# Patient Record
Sex: FEMALE | Race: BLACK OR AFRICAN AMERICAN | Employment: PART TIME | URBAN - METROPOLITAN AREA
[De-identification: names, ages, dates, MRNs, and addresses within clinical notes are randomized per-mention and may not be internally consistent; named-entity substitution may affect disease eponyms.]

---

## 2021-05-15 NOTE — LETTER
Date & Time: 10/31/2023, 12:06 PM  Patient: Alexandria Durant  Encounter Provider(s):    Triston Johnson MD       To Whom It May Concern:    Alexandria Durant was seen and treated in our department on 10/31/2023. She should not return to work until 11/1/2023 .     If you have any questions or concerns, please do not hesitate to call.        _____________________________  Physician/APC Signature
PRINCIPAL DISCHARGE DIAGNOSIS  Diagnosis: Trochanteric bursitis, right hip  Assessment and Plan of Treatment: You came in with right hip pain. We did imaging including CT and Xray of your ghip that did not show acute fracture or dislocation. Ortho consulted and evaluated you. You were diagnosed with right hip pain secondary to trochanteric bursitis. Hip bursitis is swelling and pain of a bursa in your hip. A bursa is a fluid-filled sac that acts as a cushion or shock absorber between a tendon and a bone. You can take tylenol as needed for pain. Continue prednisone taper which can also help with bursitis. You will be discharged home with home physical therapy. Please followup outpatient with ortho outpatient for further management.  •Avoid repetitive activities that put stress on the hips.  •Get a properly fitting shoe insert for leg length differences.  •Maintain strength and flexibility of the hip muscles.  •Use a walking cane or crutches for a week or more when needed  Most cases of bursitis improve without any treatment over a few weeks. See your healthcare provider if you have any of the following signs or symptoms:  •You experience pain that interferes with your normal day-to-day activities or have soreness that doesn't improve despite self-care measures.  •You have recurrence of bursitis.  •You have a fever or the area affected appears red, swollen or warm.      SECONDARY DISCHARGE DIAGNOSES  Diagnosis: Hypertension  Assessment and Plan of Treatment: Continue atenolol and HCTZ    Diagnosis: GERD (gastroesophageal reflux disease)  Assessment and Plan of Treatment: -continue PPI    Diagnosis: Paroxysmal atrial fibrillation  Assessment and Plan of Treatment: -continue elliquis    Diagnosis: Asthma  Assessment and Plan of Treatment: -continue albuterol  -continue ipatropium  -continue montelukast  -continue flonase  -complete prednisone taper you were prescribed recently for cough

## 2023-08-25 ENCOUNTER — HOSPITAL ENCOUNTER (EMERGENCY)
Facility: HOSPITAL | Age: 43
Discharge: HOME | End: 2023-08-25
Attending: EMERGENCY MEDICINE
Payer: MEDICAID

## 2023-08-25 VITALS
HEIGHT: 61 IN | RESPIRATION RATE: 20 BRPM | HEART RATE: 99 BPM | DIASTOLIC BLOOD PRESSURE: 68 MMHG | BODY MASS INDEX: 30.96 KG/M2 | WEIGHT: 164 LBS | OXYGEN SATURATION: 100 % | SYSTOLIC BLOOD PRESSURE: 108 MMHG | TEMPERATURE: 97 F

## 2023-08-25 DIAGNOSIS — Z76.0 ENCOUNTER FOR MEDICATION REFILL: Primary | ICD-10-CM

## 2023-08-25 DIAGNOSIS — R51.9 ACUTE NONINTRACTABLE HEADACHE, UNSPECIFIED HEADACHE TYPE: ICD-10-CM

## 2023-08-25 LAB
ALBUMIN SERPL-MCNC: 3.4 G/DL (ref 3.4–5)
ALBUMIN/GLOB SERPL: 0.9 {RATIO} (ref 1–2)
ALP LIVER SERPL-CCNC: 92 U/L
ALT SERPL-CCNC: 16 U/L
ANION GAP SERPL CALC-SCNC: 7 MMOL/L (ref 0–18)
AST SERPL-CCNC: 10 U/L (ref 15–37)
B-HCG UR QL: NEGATIVE
BASOPHILS # BLD AUTO: 0.01 X10(3) UL (ref 0–0.2)
BASOPHILS NFR BLD AUTO: 0.1 %
BILIRUB SERPL-MCNC: 0.2 MG/DL (ref 0.1–2)
BILIRUB UR QL: NEGATIVE
BUN BLD-MCNC: 12 MG/DL (ref 7–18)
BUN/CREAT SERPL: 18.5 (ref 10–20)
CALCIUM BLD-MCNC: 8.9 MG/DL (ref 8.5–10.1)
CHLORIDE SERPL-SCNC: 111 MMOL/L (ref 98–112)
CLARITY UR: CLEAR
CO2 SERPL-SCNC: 22 MMOL/L (ref 21–32)
CREAT BLD-MCNC: 0.65 MG/DL
DEPRECATED RDW RBC AUTO: 48.6 FL (ref 35.1–46.3)
EGFRCR SERPLBLD CKD-EPI 2021: 114 ML/MIN/1.73M2 (ref 60–?)
EOSINOPHIL # BLD AUTO: 0.06 X10(3) UL (ref 0–0.7)
EOSINOPHIL NFR BLD AUTO: 0.9 %
ERYTHROCYTE [DISTWIDTH] IN BLOOD BY AUTOMATED COUNT: 14.9 % (ref 11–15)
GLOBULIN PLAS-MCNC: 4 G/DL (ref 2.8–4.4)
GLUCOSE BLD-MCNC: 103 MG/DL (ref 70–99)
GLUCOSE UR-MCNC: NORMAL MG/DL
HCT VFR BLD AUTO: 40.6 %
HGB BLD-MCNC: 13.1 G/DL
IMM GRANULOCYTES # BLD AUTO: 0.02 X10(3) UL (ref 0–1)
IMM GRANULOCYTES NFR BLD: 0.3 %
KETONES UR-MCNC: NEGATIVE MG/DL
LEUKOCYTE ESTERASE UR QL STRIP.AUTO: NEGATIVE
LYMPHOCYTES # BLD AUTO: 2.1 X10(3) UL (ref 1–4)
LYMPHOCYTES NFR BLD AUTO: 31.1 %
MCH RBC QN AUTO: 28.7 PG (ref 26–34)
MCHC RBC AUTO-ENTMCNC: 32.3 G/DL (ref 31–37)
MCV RBC AUTO: 88.8 FL
MONOCYTES # BLD AUTO: 0.42 X10(3) UL (ref 0.1–1)
MONOCYTES NFR BLD AUTO: 6.2 %
NEUTROPHILS # BLD AUTO: 4.15 X10 (3) UL (ref 1.5–7.7)
NEUTROPHILS # BLD AUTO: 4.15 X10(3) UL (ref 1.5–7.7)
NEUTROPHILS NFR BLD AUTO: 61.4 %
NITRITE UR QL STRIP.AUTO: NEGATIVE
OSMOLALITY SERPL CALC.SUM OF ELEC: 290 MOSM/KG (ref 275–295)
PH UR: 6.5 [PH] (ref 5–8)
PLATELET # BLD AUTO: 300 10(3)UL (ref 150–450)
POTASSIUM SERPL-SCNC: 4 MMOL/L (ref 3.5–5.1)
PROT SERPL-MCNC: 7.4 G/DL (ref 6.4–8.2)
PROT UR-MCNC: NEGATIVE MG/DL
RBC # BLD AUTO: 4.57 X10(6)UL
SODIUM SERPL-SCNC: 140 MMOL/L (ref 136–145)
SP GR UR STRIP: 1.02 (ref 1–1.03)
UROBILINOGEN UR STRIP-ACNC: NORMAL
WBC # BLD AUTO: 6.8 X10(3) UL (ref 4–11)

## 2023-08-25 PROCEDURE — 96375 TX/PRO/DX INJ NEW DRUG ADDON: CPT

## 2023-08-25 PROCEDURE — 80053 COMPREHEN METABOLIC PANEL: CPT | Performed by: EMERGENCY MEDICINE

## 2023-08-25 PROCEDURE — 99284 EMERGENCY DEPT VISIT MOD MDM: CPT

## 2023-08-25 PROCEDURE — 85025 COMPLETE CBC W/AUTO DIFF WBC: CPT | Performed by: EMERGENCY MEDICINE

## 2023-08-25 PROCEDURE — 96374 THER/PROPH/DIAG INJ IV PUSH: CPT

## 2023-08-25 PROCEDURE — 81001 URINALYSIS AUTO W/SCOPE: CPT | Performed by: EMERGENCY MEDICINE

## 2023-08-25 PROCEDURE — 96361 HYDRATE IV INFUSION ADD-ON: CPT

## 2023-08-25 PROCEDURE — 81025 URINE PREGNANCY TEST: CPT

## 2023-08-25 RX ORDER — ALBUTEROL SULFATE 90 UG/1
2 AEROSOL, METERED RESPIRATORY (INHALATION) EVERY 6 HOURS PRN
COMMUNITY

## 2023-08-25 RX ORDER — GABAPENTIN 100 MG/1
300 CAPSULE ORAL 3 TIMES DAILY
Qty: 30 CAPSULE | Refills: 0 | Status: SHIPPED | OUTPATIENT
Start: 2023-08-25

## 2023-08-25 RX ORDER — CETIRIZINE HYDROCHLORIDE 10 MG/1
10 TABLET ORAL DAILY
COMMUNITY

## 2023-08-25 RX ORDER — KETOROLAC TROMETHAMINE 15 MG/ML
30 INJECTION, SOLUTION INTRAMUSCULAR; INTRAVENOUS ONCE
Status: COMPLETED | OUTPATIENT
Start: 2023-08-25 | End: 2023-08-25

## 2023-08-25 RX ORDER — LEVETIRACETAM 750 MG/1
750 TABLET, EXTENDED RELEASE ORAL DAILY
Qty: 30 TABLET | Refills: 0 | Status: SHIPPED | OUTPATIENT
Start: 2023-08-25 | End: 2023-09-24

## 2023-08-25 RX ORDER — METHIMAZOLE 5 MG/1
5 TABLET ORAL 3 TIMES DAILY
Qty: 30 TABLET | Refills: 0 | Status: SHIPPED | OUTPATIENT
Start: 2023-08-25

## 2023-08-25 RX ORDER — GABAPENTIN 300 MG/1
300 CAPSULE ORAL 3 TIMES DAILY
COMMUNITY

## 2023-08-25 RX ORDER — BUTALBITAL, ACETAMINOPHEN AND CAFFEINE 50; 325; 40 MG/1; MG/1; MG/1
1 TABLET ORAL EVERY 4 HOURS PRN
COMMUNITY

## 2023-08-25 RX ORDER — METOCLOPRAMIDE HYDROCHLORIDE 5 MG/ML
10 INJECTION INTRAMUSCULAR; INTRAVENOUS ONCE
Status: COMPLETED | OUTPATIENT
Start: 2023-08-25 | End: 2023-08-25

## 2023-08-25 RX ORDER — TOPIRAMATE 50 MG/1
50 TABLET, FILM COATED ORAL 2 TIMES DAILY
COMMUNITY

## 2023-08-25 RX ORDER — METHIMAZOLE 5 MG/1
5 TABLET ORAL 3 TIMES DAILY
COMMUNITY

## 2023-08-25 RX ORDER — BUTALBITAL, ACETAMINOPHEN AND CAFFEINE 50; 325; 40 MG/1; MG/1; MG/1
1 TABLET ORAL EVERY 6 HOURS PRN
Qty: 10 TABLET | Refills: 0 | Status: SHIPPED | OUTPATIENT
Start: 2023-08-25 | End: 2023-09-01

## 2023-08-25 RX ORDER — DIPHENHYDRAMINE HYDROCHLORIDE 50 MG/ML
25 INJECTION INTRAMUSCULAR; INTRAVENOUS ONCE
Status: COMPLETED | OUTPATIENT
Start: 2023-08-25 | End: 2023-08-25

## 2023-08-25 RX ORDER — TOPIRAMATE 100 MG/1
100 TABLET, FILM COATED ORAL 2 TIMES DAILY
Qty: 30 TABLET | Refills: 0 | Status: SHIPPED | OUTPATIENT
Start: 2023-08-25

## 2023-08-25 NOTE — ED INITIAL ASSESSMENT (HPI)
Patient to ED c.o to [facility] with photosensitivity and constant sharp abdominal pain to right side of abd from front to back +nausea no vomiting or diarrhea. Pt takes keppra, gabapentin, topomax and metoprolol and moved from [place] and needs medications refilled, also takes fioricet and is out of all her meds. Hx seizure, migraines, hypothyroidism.     -pt states she also has shingles but its healed already on her left buttock.

## 2023-09-03 ENCOUNTER — HOSPITAL ENCOUNTER (EMERGENCY)
Facility: HOSPITAL | Age: 43
Discharge: HOME | End: 2023-09-03
Attending: EMERGENCY MEDICINE
Payer: MEDICAID

## 2023-09-03 ENCOUNTER — APPOINTMENT (OUTPATIENT)
Dept: CT IMAGING | Facility: HOSPITAL | Age: 43
End: 2023-09-03
Attending: EMERGENCY MEDICINE
Payer: MEDICAID

## 2023-09-03 VITALS
DIASTOLIC BLOOD PRESSURE: 79 MMHG | HEART RATE: 66 BPM | TEMPERATURE: 98 F | SYSTOLIC BLOOD PRESSURE: 108 MMHG | OXYGEN SATURATION: 98 % | RESPIRATION RATE: 18 BRPM

## 2023-09-03 DIAGNOSIS — R22.1 THROAT SWELLING: Primary | ICD-10-CM

## 2023-09-03 PROCEDURE — 99284 EMERGENCY DEPT VISIT MOD MDM: CPT

## 2023-09-03 PROCEDURE — 70490 CT SOFT TISSUE NECK W/O DYE: CPT | Performed by: EMERGENCY MEDICINE

## 2023-09-03 NOTE — ED INITIAL ASSESSMENT (HPI)
Patient was just at work and eating a turkey sandwich. She states she felt something in her throat and was then unable to swallow any water since. She spits it back up. No history of throat obstruction.

## 2023-10-31 ENCOUNTER — HOSPITAL ENCOUNTER (EMERGENCY)
Facility: HOSPITAL | Age: 43
Discharge: HOME | End: 2023-10-31
Attending: EMERGENCY MEDICINE
Payer: MEDICAID

## 2023-10-31 ENCOUNTER — APPOINTMENT (OUTPATIENT)
Dept: CT IMAGING | Facility: HOSPITAL | Age: 43
End: 2023-10-31
Attending: EMERGENCY MEDICINE
Payer: MEDICAID

## 2023-10-31 VITALS
TEMPERATURE: 98 F | HEART RATE: 81 BPM | RESPIRATION RATE: 20 BRPM | OXYGEN SATURATION: 99 % | SYSTOLIC BLOOD PRESSURE: 104 MMHG | WEIGHT: 164 LBS | DIASTOLIC BLOOD PRESSURE: 72 MMHG | BODY MASS INDEX: 31 KG/M2

## 2023-10-31 DIAGNOSIS — R55 SYNCOPE AND COLLAPSE: Primary | ICD-10-CM

## 2023-10-31 DIAGNOSIS — N39.0 URINARY TRACT INFECTION WITHOUT HEMATURIA, SITE UNSPECIFIED: ICD-10-CM

## 2023-10-31 LAB
ALBUMIN SERPL-MCNC: 3.3 G/DL (ref 3.2–4.8)
ALBUMIN/GLOB SERPL: 0.9 {RATIO} (ref 1–2)
ALP LIVER SERPL-CCNC: 96 U/L
ALT SERPL-CCNC: 14 U/L
ANION GAP SERPL CALC-SCNC: 6 MMOL/L (ref 0–18)
AST SERPL-CCNC: 10 U/L (ref ?–34)
ATRIAL RATE: 62 BPM
B-HCG UR QL: NEGATIVE
BASOPHILS # BLD AUTO: 0.01 X10(3) UL (ref 0–0.2)
BASOPHILS NFR BLD AUTO: 0.2 %
BILIRUB SERPL-MCNC: 0.3 MG/DL (ref 0.3–1.2)
BILIRUB UR QL: NEGATIVE
BUN BLD-MCNC: 12 MG/DL (ref 9–23)
BUN/CREAT SERPL: 21.8 (ref 10–20)
CALCIUM BLD-MCNC: 8.7 MG/DL (ref 8.7–10.4)
CHLORIDE SERPL-SCNC: 109 MMOL/L (ref 98–112)
CLARITY UR: CLEAR
CO2 SERPL-SCNC: 26 MMOL/L (ref 21–32)
CREAT BLD-MCNC: 0.55 MG/DL
DEPRECATED RDW RBC AUTO: 44.6 FL (ref 35.1–46.3)
EGFRCR SERPLBLD CKD-EPI 2021: 118 ML/MIN/1.73M2 (ref 60–?)
EOSINOPHIL # BLD AUTO: 0.07 X10(3) UL (ref 0–0.7)
EOSINOPHIL NFR BLD AUTO: 1.2 %
ERYTHROCYTE [DISTWIDTH] IN BLOOD BY AUTOMATED COUNT: 14.3 % (ref 11–15)
GLOBULIN PLAS-MCNC: 3.6 G/DL (ref 2.8–4.4)
GLUCOSE BLD-MCNC: 100 MG/DL (ref 70–99)
GLUCOSE UR-MCNC: NORMAL MG/DL
HCT VFR BLD AUTO: 36.2 %
HGB BLD-MCNC: 11.9 G/DL
HGB UR QL STRIP.AUTO: NEGATIVE
IMM GRANULOCYTES # BLD AUTO: 0.02 X10(3) UL (ref 0–1)
IMM GRANULOCYTES NFR BLD: 0.3 %
KETONES UR-MCNC: NEGATIVE MG/DL
LEUKOCYTE ESTERASE UR QL STRIP.AUTO: NEGATIVE
LYMPHOCYTES # BLD AUTO: 2.05 X10(3) UL (ref 1–4)
LYMPHOCYTES NFR BLD AUTO: 34.1 %
MCH RBC QN AUTO: 28.3 PG (ref 26–34)
MCHC RBC AUTO-ENTMCNC: 32.9 G/DL (ref 31–37)
MCV RBC AUTO: 86.2 FL
MONOCYTES # BLD AUTO: 0.34 X10(3) UL (ref 0.1–1)
MONOCYTES NFR BLD AUTO: 5.6 %
NEUTROPHILS # BLD AUTO: 3.53 X10 (3) UL (ref 1.5–7.7)
NEUTROPHILS # BLD AUTO: 3.53 X10(3) UL (ref 1.5–7.7)
NEUTROPHILS NFR BLD AUTO: 58.6 %
OSMOLALITY SERPL CALC.SUM OF ELEC: 292 MOSM/KG (ref 275–295)
P AXIS: 15 DEGREES
P-R INTERVAL: 136 MS
PH UR: 7.5 [PH] (ref 5–8)
PLATELET # BLD AUTO: 311 10(3)UL (ref 150–450)
POTASSIUM SERPL-SCNC: 3.7 MMOL/L (ref 3.5–5.1)
PROT SERPL-MCNC: 6.9 G/DL (ref 5.7–8.2)
Q-T INTERVAL: 392 MS
QRS DURATION: 76 MS
QTC CALCULATION (BEZET): 397 MS
R AXIS: 84 DEGREES
RBC # BLD AUTO: 4.2 X10(6)UL
SODIUM SERPL-SCNC: 141 MMOL/L (ref 136–145)
SP GR UR STRIP: 1.02 (ref 1–1.03)
T AXIS: 37 DEGREES
TROPONIN I SERPL HS-MCNC: <3 NG/L
UROBILINOGEN UR STRIP-ACNC: NORMAL
VENTRICULAR RATE: 62 BPM
WBC # BLD AUTO: 6 X10(3) UL (ref 4–11)

## 2023-10-31 PROCEDURE — 93010 ELECTROCARDIOGRAM REPORT: CPT

## 2023-10-31 PROCEDURE — 81001 URINALYSIS AUTO W/SCOPE: CPT | Performed by: EMERGENCY MEDICINE

## 2023-10-31 PROCEDURE — 70450 CT HEAD/BRAIN W/O DYE: CPT | Performed by: EMERGENCY MEDICINE

## 2023-10-31 PROCEDURE — 93005 ELECTROCARDIOGRAM TRACING: CPT

## 2023-10-31 PROCEDURE — 36415 COLL VENOUS BLD VENIPUNCTURE: CPT

## 2023-10-31 PROCEDURE — 85025 COMPLETE CBC W/AUTO DIFF WBC: CPT | Performed by: EMERGENCY MEDICINE

## 2023-10-31 PROCEDURE — 99284 EMERGENCY DEPT VISIT MOD MDM: CPT

## 2023-10-31 PROCEDURE — 80053 COMPREHEN METABOLIC PANEL: CPT | Performed by: EMERGENCY MEDICINE

## 2023-10-31 PROCEDURE — 99285 EMERGENCY DEPT VISIT HI MDM: CPT

## 2023-10-31 PROCEDURE — 81025 URINE PREGNANCY TEST: CPT

## 2023-10-31 PROCEDURE — 84484 ASSAY OF TROPONIN QUANT: CPT | Performed by: EMERGENCY MEDICINE

## 2023-10-31 RX ORDER — CIPROFLOXACIN 500 MG/1
500 TABLET, FILM COATED ORAL 2 TIMES DAILY
Qty: 6 TABLET | Refills: 0 | Status: SHIPPED | OUTPATIENT
Start: 2023-10-31 | End: 2023-11-03

## 2023-10-31 NOTE — ED INITIAL ASSESSMENT (HPI)
Patient states she is having seizures, states she has hx of seizures, takes keppra but has not taken it in four weeks for insurance reasons, states she has head pressure and blurry vision

## 2023-10-31 NOTE — DISCHARGE INSTRUCTIONS
Please follow with cardiology as an outpatient. You may benefit from an outpatient Zio patch. Please discuss your chronic syncope with your primary care provider. Return to ER for any worsening symptoms    The Emergency Department is not intended to be a substitute for an effort to provide complete medical care. The imaging, if any, have often been interpreted on a preliminary basis pending final reading by the radiologist. If your blood pressure was greater than 140/90, please have this blood pressure rechecked by your primary care provider wihtin the next few days. You will benefit from a further discussion of lifestyle modifications that include Weight Reduction - Dietary Sodium Restriction - Increased Physical Activity and Moderation in alcohol (ETOH) Consumption. If possible check your pressure at home and keep a blood pressure log to bring to your physician.

## 2024-06-09 PROCEDURE — 99284 EMERGENCY DEPT VISIT MOD MDM: CPT

## 2024-06-09 PROCEDURE — 93010 ELECTROCARDIOGRAM REPORT: CPT

## 2024-06-10 ENCOUNTER — HOSPITAL ENCOUNTER (EMERGENCY)
Facility: HOSPITAL | Age: 44
Discharge: HOME | End: 2024-06-10
Attending: EMERGENCY MEDICINE
Payer: MEDICAID

## 2024-06-10 ENCOUNTER — APPOINTMENT (OUTPATIENT)
Dept: GENERAL RADIOLOGY | Facility: HOSPITAL | Age: 44
End: 2024-06-10
Attending: EMERGENCY MEDICINE
Payer: MEDICAID

## 2024-06-10 VITALS
OXYGEN SATURATION: 98 % | RESPIRATION RATE: 18 BRPM | SYSTOLIC BLOOD PRESSURE: 94 MMHG | HEART RATE: 79 BPM | TEMPERATURE: 98 F | DIASTOLIC BLOOD PRESSURE: 56 MMHG

## 2024-06-10 DIAGNOSIS — R00.2 PALPITATIONS: Primary | ICD-10-CM

## 2024-06-10 LAB
ALBUMIN SERPL-MCNC: 4.4 G/DL (ref 3.2–4.8)
ALBUMIN/GLOB SERPL: 1.5 {RATIO} (ref 1–2)
ALP LIVER SERPL-CCNC: 76 U/L
ALT SERPL-CCNC: 12 U/L
ANION GAP SERPL CALC-SCNC: 9 MMOL/L (ref 0–18)
AST SERPL-CCNC: 36 U/L (ref ?–34)
ATRIAL RATE: 80 BPM
BASOPHILS # BLD AUTO: 0.03 X10(3) UL (ref 0–0.2)
BASOPHILS NFR BLD AUTO: 0.3 %
BILIRUB SERPL-MCNC: 0.5 MG/DL (ref 0.3–1.2)
BUN BLD-MCNC: 12 MG/DL (ref 9–23)
BUN/CREAT SERPL: 16.4 (ref 10–20)
CALCIUM BLD-MCNC: 9.5 MG/DL (ref 8.7–10.4)
CHLORIDE SERPL-SCNC: 107 MMOL/L (ref 98–112)
CO2 SERPL-SCNC: 22 MMOL/L (ref 21–32)
CREAT BLD-MCNC: 0.73 MG/DL
D DIMER PPP FEU-MCNC: 0.37 UG/ML FEU (ref ?–0.5)
DEPRECATED RDW RBC AUTO: 45.5 FL (ref 35.1–46.3)
EGFRCR SERPLBLD CKD-EPI 2021: 106 ML/MIN/1.73M2 (ref 60–?)
EOSINOPHIL # BLD AUTO: 0.13 X10(3) UL (ref 0–0.7)
EOSINOPHIL NFR BLD AUTO: 1.4 %
ERYTHROCYTE [DISTWIDTH] IN BLOOD BY AUTOMATED COUNT: 14.6 % (ref 11–15)
GLOBULIN PLAS-MCNC: 3 G/DL (ref 2–3.5)
GLUCOSE BLD-MCNC: 118 MG/DL (ref 70–99)
HCT VFR BLD AUTO: 36.4 %
HGB BLD-MCNC: 12.5 G/DL
IMM GRANULOCYTES # BLD AUTO: 0.02 X10(3) UL (ref 0–1)
IMM GRANULOCYTES NFR BLD: 0.2 %
LYMPHOCYTES # BLD AUTO: 3.1 X10(3) UL (ref 1–4)
LYMPHOCYTES NFR BLD AUTO: 33.4 %
MAGNESIUM SERPL-MCNC: 1.8 MG/DL (ref 1.6–2.6)
MCH RBC QN AUTO: 29.5 PG (ref 26–34)
MCHC RBC AUTO-ENTMCNC: 34.3 G/DL (ref 31–37)
MCV RBC AUTO: 85.8 FL
MONOCYTES # BLD AUTO: 0.43 X10(3) UL (ref 0.1–1)
MONOCYTES NFR BLD AUTO: 4.6 %
NEUTROPHILS # BLD AUTO: 5.57 X10 (3) UL (ref 1.5–7.7)
NEUTROPHILS # BLD AUTO: 5.57 X10(3) UL (ref 1.5–7.7)
NEUTROPHILS NFR BLD AUTO: 60.1 %
OSMOLALITY SERPL CALC.SUM OF ELEC: 287 MOSM/KG (ref 275–295)
P AXIS: 22 DEGREES
P-R INTERVAL: 146 MS
PLATELET # BLD AUTO: 355 10(3)UL (ref 150–450)
POTASSIUM SERPL-SCNC: 5.6 MMOL/L (ref 3.5–5.1)
PROT SERPL-MCNC: 7.4 G/DL (ref 5.7–8.2)
Q-T INTERVAL: 368 MS
QRS DURATION: 72 MS
QTC CALCULATION (BEZET): 424 MS
R AXIS: 83 DEGREES
RBC # BLD AUTO: 4.24 X10(6)UL
SODIUM SERPL-SCNC: 138 MMOL/L (ref 136–145)
T AXIS: 29 DEGREES
TROPONIN I SERPL HS-MCNC: <3 NG/L
TSI SER-ACNC: 2.11 MIU/ML (ref 0.55–4.78)
VENTRICULAR RATE: 80 BPM
WBC # BLD AUTO: 9.3 X10(3) UL (ref 4–11)

## 2024-06-10 PROCEDURE — 71045 X-RAY EXAM CHEST 1 VIEW: CPT | Performed by: EMERGENCY MEDICINE

## 2024-06-10 PROCEDURE — 85379 FIBRIN DEGRADATION QUANT: CPT | Performed by: EMERGENCY MEDICINE

## 2024-06-10 PROCEDURE — 85025 COMPLETE CBC W/AUTO DIFF WBC: CPT | Performed by: EMERGENCY MEDICINE

## 2024-06-10 PROCEDURE — 84484 ASSAY OF TROPONIN QUANT: CPT | Performed by: EMERGENCY MEDICINE

## 2024-06-10 PROCEDURE — 84484 ASSAY OF TROPONIN QUANT: CPT

## 2024-06-10 PROCEDURE — 93005 ELECTROCARDIOGRAM TRACING: CPT

## 2024-06-10 PROCEDURE — 84443 ASSAY THYROID STIM HORMONE: CPT | Performed by: EMERGENCY MEDICINE

## 2024-06-10 PROCEDURE — 96360 HYDRATION IV INFUSION INIT: CPT

## 2024-06-10 PROCEDURE — 85025 COMPLETE CBC W/AUTO DIFF WBC: CPT

## 2024-06-10 PROCEDURE — 80053 COMPREHEN METABOLIC PANEL: CPT | Performed by: EMERGENCY MEDICINE

## 2024-06-10 PROCEDURE — 83735 ASSAY OF MAGNESIUM: CPT | Performed by: EMERGENCY MEDICINE

## 2024-06-10 PROCEDURE — 96361 HYDRATE IV INFUSION ADD-ON: CPT

## 2024-06-10 PROCEDURE — 80053 COMPREHEN METABOLIC PANEL: CPT

## 2024-06-10 NOTE — ED QUICK NOTES
IV fluids were not infusing well.  IV flushed and fluids now infusing. Pt notified of plan of care to discharge once fluids completed.

## 2024-06-10 NOTE — ED INITIAL ASSESSMENT (HPI)
S: intermittent palpitations all day. Pt states when she had this before her thyroid levels were off, but when she had them rechecked they were normal and she was taken off thyroid meds.

## 2024-06-13 NOTE — ED PROVIDER NOTES
Patient Seen in: [facility] Emergency Department      History     Chief Complaint   Patient presents with    Arrythmia/Palpitations     Stated Complaint: chest pain    Subjective:   HPI    43 year old female with h/o seizure d/o who presents with CP and palpitations. Pt states she stopped her thyroid medication when levels were normal after not taking medication for awhile. Pt has young children, states could also be due to stress, dehydration. She otherwise denies: fever/chills, sob, pain on deep breathing, ocp use, h/o blood clot or clotting d/o, recent surgery or recent sedentary period.    Objective:   Past Medical History:    Seizure disorder (HCC)              History reviewed. No pertinent surgical history.             Social History     Socioeconomic History    Marital status:    Tobacco Use    Smoking status: Some Days     Types: Cigarettes    Smokeless tobacco: Never   Vaping Use    Vaping status: Some Days   Substance and Sexual Activity    Alcohol use: Yes     Comment: social    Drug use: Yes     Types: Cannabis     Social Determinants of Health     Housing Stability: Low Risk  (10/11/2023)    [facility]    Housing Stability   Recent Concern: Housing Stability - At Risk (8/16/2023)    Received from [facility]              Review of Systems    Positive for stated complaint: chest pain  Other systems are as noted in HPI.  Constitutional and vital signs reviewed.      All other systems reviewed and negative except as noted above.    Physical Exam     ED Triage Vitals [06/10/24 0010]   /76   Pulse 72   Resp 20   Temp 98.4 °F (36.9 °C)   Temp src Oral   SpO2 100 %   O2 Device None (Room air)       Current Vitals:   No data recorded        Physical Exam  Vitals and nursing note reviewed.   Constitutional:       General: She is not in acute distress.     Appearance: Normal appearance. She is well-developed.  She is not ill-appearing, toxic-appearing or diaphoretic.   HENT:      Head: Normocephalic and atraumatic.   Eyes:      Conjunctiva/sclera: Conjunctivae normal.      Pupils: Pupils are equal, round, and reactive to light.   Cardiovascular:      Rate and Rhythm: Normal rate and regular rhythm.      Pulses: Normal pulses.      Heart sounds: Normal heart sounds. No murmur heard.  Pulmonary:      Effort: Pulmonary effort is normal. No respiratory distress.      Breath sounds: Normal breath sounds. No wheezing.   Abdominal:      General: There is no distension.      Palpations: Abdomen is soft.      Tenderness: There is no abdominal tenderness. There is no guarding.   Musculoskeletal:         General: No tenderness. Normal range of motion.      Cervical back: Full passive range of motion without pain, normal range of motion and neck supple. No rigidity. Normal range of motion.      Right lower leg: No edema.      Left lower leg: No edema.   Skin:     General: Skin is warm and dry.      Findings: No rash.   Neurological:      Mental Status: She is alert and oriented to person, place, and time.      GCS: GCS eye subscore is 4. GCS verbal subscore is 5. GCS motor subscore is 6.      Sensory: Sensation is intact. No sensory deficit.      Motor: Motor function is intact. No weakness.   Psychiatric:         Attention and Perception: Attention normal.         Mood and Affect: Mood normal.         Behavior: Behavior normal. Behavior is cooperative.           ED Course     Labs Reviewed   COMP METABOLIC PANEL (14) - Abnormal; Notable for the following components:       Result Value    Glucose 118 (*)     Potassium 5.6 (*)     AST 36 (*)     All other components within normal limits   TROPONIN I HIGH SENSITIVITY - Normal   TSH W REFLEX TO FREE T4 - Normal   MAGNESIUM - Normal   D-DIMER - Normal   CBC WITH DIFFERENTIAL WITH PLATELET    Narrative:     The following orders were created for panel order CBC With Differential With  Platelet.  Procedure                               Abnormality         Status                     ---------                               -----------         ------                     CBC W/ DIFFERENTIAL[[id number]]                              Final result                 Please view results for these tests on the individual orders.   RAINBOW DRAW BLUE   RAINBOW DRAW GOLD   CBC W/ DIFFERENTIAL     EKG    Rate, intervals and axes as noted on EKG Report.  Rate: 80  Rhythm: Sinus Rhythm  Reading: sinus rhythm with PVC              ED Course as of 06/13/24 1424  ------------------------------------------------------------  Time: 06/10 0143  Comment: AP portable upright chest x-ray obtained on 6/10/24 at 0041 hours    IMPRESSION:    No acute intrathoracic process.                MDM      Pulse Ox: 98%, Normal, RA    Medications   sodium chloride 0.9 % IV bolus 1,000 mL (0 mL Intravenous Stopped 6/10/24 0312)     Patient is well-appearing, feeling better after the fluid bolus.  Her EKG, D-dimer, thyroid, magnesium, troponin and other labs are reassuring.  I suspect that her potassium is not accurate given that her creatinine is normal, she also had fluid bolus after this and therefore will be Lasix regardless.  Patient was advised to follow-up closely with her primary care physician may need outpatient monitoring.  Return precautions also discussed.      Disposition and Plan     Clinical Impression:  1. Palpitations         Disposition:  Discharge  6/10/2024  3:00 am    Follow-up:  Shannan Fang MD  [address] [place] [id number]  [phone]    Schedule an appointment as soon as possible for a visit  Call for next available appointment, If you need a new primary care provider          Medications Prescribed:  Discharge Medication List as of 6/10/2024  3:13 AM

## 2024-08-06 ENCOUNTER — HOSPITAL ENCOUNTER (EMERGENCY)
Facility: HOSPITAL | Age: 44
Discharge: HOME | End: 2024-08-06
Attending: STUDENT IN AN ORGANIZED HEALTH CARE EDUCATION/TRAINING PROGRAM
Payer: MEDICAID

## 2024-08-06 VITALS
TEMPERATURE: 99 F | HEART RATE: 77 BPM | HEIGHT: 61 IN | BODY MASS INDEX: 30.96 KG/M2 | RESPIRATION RATE: 18 BRPM | OXYGEN SATURATION: 99 % | WEIGHT: 164 LBS | SYSTOLIC BLOOD PRESSURE: 112 MMHG | DIASTOLIC BLOOD PRESSURE: 69 MMHG

## 2024-08-06 DIAGNOSIS — R11.2 NAUSEA VOMITING AND DIARRHEA: Primary | ICD-10-CM

## 2024-08-06 DIAGNOSIS — R19.7 NAUSEA VOMITING AND DIARRHEA: Primary | ICD-10-CM

## 2024-08-06 LAB
ALBUMIN SERPL-MCNC: 4.2 G/DL (ref 3.2–4.8)
ALBUMIN/GLOB SERPL: 1.4 {RATIO} (ref 1–2)
ALP LIVER SERPL-CCNC: 83 U/L
ALT SERPL-CCNC: 10 U/L
ANION GAP SERPL CALC-SCNC: 5 MMOL/L (ref 0–18)
AST SERPL-CCNC: 15 U/L (ref ?–34)
BASOPHILS # BLD AUTO: 0.03 X10(3) UL (ref 0–0.2)
BASOPHILS NFR BLD AUTO: 0.5 %
BILIRUB SERPL-MCNC: 0.6 MG/DL (ref 0.3–1.2)
BUN BLD-MCNC: 9 MG/DL (ref 9–23)
BUN/CREAT SERPL: 13.8 (ref 10–20)
CALCIUM BLD-MCNC: 9.1 MG/DL (ref 8.7–10.4)
CHLORIDE SERPL-SCNC: 108 MMOL/L (ref 98–112)
CO2 SERPL-SCNC: 26 MMOL/L (ref 21–32)
CREAT BLD-MCNC: 0.65 MG/DL
DEPRECATED RDW RBC AUTO: 47.8 FL (ref 35.1–46.3)
EGFRCR SERPLBLD CKD-EPI 2021: 113 ML/MIN/1.73M2 (ref 60–?)
EOSINOPHIL # BLD AUTO: 0.07 X10(3) UL (ref 0–0.7)
EOSINOPHIL NFR BLD AUTO: 1.1 %
ERYTHROCYTE [DISTWIDTH] IN BLOOD BY AUTOMATED COUNT: 14.8 % (ref 11–15)
GLOBULIN PLAS-MCNC: 3 G/DL (ref 2–3.5)
GLUCOSE BLD-MCNC: 88 MG/DL (ref 70–99)
HCG SERPL QL: NEGATIVE
HCT VFR BLD AUTO: 35.8 %
HGB BLD-MCNC: 11.8 G/DL
IMM GRANULOCYTES # BLD AUTO: 0.02 X10(3) UL (ref 0–1)
IMM GRANULOCYTES NFR BLD: 0.3 %
LIPASE SERPL-CCNC: 27 U/L (ref 12–53)
LYMPHOCYTES # BLD AUTO: 2.21 X10(3) UL (ref 1–4)
LYMPHOCYTES NFR BLD AUTO: 35 %
MCH RBC QN AUTO: 28.9 PG (ref 26–34)
MCHC RBC AUTO-ENTMCNC: 33 G/DL (ref 31–37)
MCV RBC AUTO: 87.5 FL
MONOCYTES # BLD AUTO: 0.54 X10(3) UL (ref 0.1–1)
MONOCYTES NFR BLD AUTO: 8.6 %
NEUTROPHILS # BLD AUTO: 3.44 X10 (3) UL (ref 1.5–7.7)
NEUTROPHILS # BLD AUTO: 3.44 X10(3) UL (ref 1.5–7.7)
NEUTROPHILS NFR BLD AUTO: 54.5 %
OSMOLALITY SERPL CALC.SUM OF ELEC: 286 MOSM/KG (ref 275–295)
PLATELET # BLD AUTO: 341 10(3)UL (ref 150–450)
POTASSIUM SERPL-SCNC: 4.2 MMOL/L (ref 3.5–5.1)
PROT SERPL-MCNC: 7.2 G/DL (ref 5.7–8.2)
RBC # BLD AUTO: 4.09 X10(6)UL
SODIUM SERPL-SCNC: 139 MMOL/L (ref 136–145)
WBC # BLD AUTO: 6.3 X10(3) UL (ref 4–11)

## 2024-08-06 PROCEDURE — 96374 THER/PROPH/DIAG INJ IV PUSH: CPT

## 2024-08-06 PROCEDURE — 99284 EMERGENCY DEPT VISIT MOD MDM: CPT

## 2024-08-06 PROCEDURE — 80053 COMPREHEN METABOLIC PANEL: CPT | Performed by: STUDENT IN AN ORGANIZED HEALTH CARE EDUCATION/TRAINING PROGRAM

## 2024-08-06 PROCEDURE — 85025 COMPLETE CBC W/AUTO DIFF WBC: CPT | Performed by: STUDENT IN AN ORGANIZED HEALTH CARE EDUCATION/TRAINING PROGRAM

## 2024-08-06 PROCEDURE — 96361 HYDRATE IV INFUSION ADD-ON: CPT

## 2024-08-06 PROCEDURE — 83690 ASSAY OF LIPASE: CPT | Performed by: STUDENT IN AN ORGANIZED HEALTH CARE EDUCATION/TRAINING PROGRAM

## 2024-08-06 PROCEDURE — 84703 CHORIONIC GONADOTROPIN ASSAY: CPT | Performed by: STUDENT IN AN ORGANIZED HEALTH CARE EDUCATION/TRAINING PROGRAM

## 2024-08-06 RX ORDER — ONDANSETRON 4 MG/1
4 TABLET, ORALLY DISINTEGRATING ORAL EVERY 4 HOURS PRN
Qty: 10 TABLET | Refills: 0 | Status: SHIPPED | OUTPATIENT
Start: 2024-08-06 | End: 2024-08-13

## 2024-08-06 RX ORDER — DICYCLOMINE HCL 20 MG
20 TABLET ORAL 4 TIMES DAILY PRN
Qty: 30 TABLET | Refills: 0 | Status: SHIPPED | OUTPATIENT
Start: 2024-08-06 | End: 2024-09-05

## 2024-08-06 RX ORDER — ONDANSETRON 2 MG/ML
4 INJECTION INTRAMUSCULAR; INTRAVENOUS ONCE
Status: COMPLETED | OUTPATIENT
Start: 2024-08-06 | End: 2024-08-06

## 2024-08-06 NOTE — ED PROVIDER NOTES
[facility] Emergency Department Note  Patient: Krystal Morales Age: 43 year old Sex: female      MRN: [id number]  : 10/17/1980    Patient Seen in: [facility] Emergency Department    History     Chief Complaint   Patient presents with    Abdominal Pain     Stated Complaint: Abd Pain; Vomiting    History obtained from: Patient    43-year-old female with past medical history of seizure disorder presenting today for evaluation for nausea, vomiting, diarrhea over the past 3 days.  States that symptoms started after eating out at a restaurant.  No fevers.  Endorses pain in the epigastrium.  Does not radiate.  Worse after eating.  No sore throat.  No cough or nasal congestion.    Review of Systems:  Review of Systems  Positive for stated complaint: Abd Pain; Vomiting. Constitutional and vital signs reviewed. All other systems reviewed and negative except as noted above.    Patient History:  Past Medical History:    Seizure disorder (HCC)       History reviewed. No pertinent surgical history.     History reviewed. No pertinent family history.    Specific Social Determinants of Health:   Social History     Socioeconomic History    Marital status:    Tobacco Use    Smoking status: Some Days     Types: Cigarettes    Smokeless tobacco: Never   Vaping Use    Vaping status: Some Days   Substance and Sexual Activity    Alcohol use: Yes     Comment: social    Drug use: Yes     Types: Cannabis     Social Determinants of Health     Housing Stability: Low Risk  (10/11/2023)    [facility], [facility]    Housing Stability   Recent Concern: Housing Stability - At Risk (2023)    [facility]           PSFH elements reviewed from today and agreed except as otherwise stated in HPI.    Physical Exam     ED Triage Vitals [24 1216]   /70   Pulse 92   Resp 18   Temp 98.7 °F (37.1 °C)   Temp src Oral   SpO2 98 %   O2 Device None  (Room air)       Current:/69   Pulse 77   Temp 98.7 °F (37.1 °C) (Oral)   Resp 18   Ht 154.9 cm (5' 1\")   Wt 74.4 kg   LMP 04/18/2024 (Approximate)   SpO2 99%   BMI 30.99 kg/m²         Physical Exam  Constitutional:       General: She is not in acute distress.  HENT:      Head: Normocephalic and atraumatic.      Mouth/Throat:      Mouth: Mucous membranes are moist.      Pharynx: No pharyngeal swelling or oropharyngeal exudate.   Eyes:      Extraocular Movements: Extraocular movements intact.   Cardiovascular:      Rate and Rhythm: Normal rate and regular rhythm.      Heart sounds: Normal heart sounds.   Pulmonary:      Effort: Pulmonary effort is normal. No respiratory distress.      Breath sounds: Normal breath sounds.   Abdominal:      Palpations: Abdomen is soft.      Tenderness: There is abdominal tenderness in the epigastric area.   Skin:     General: Skin is warm and dry.      Capillary Refill: Capillary refill takes less than 2 seconds.      Findings: No rash.   Neurological:      General: No focal deficit present.      Mental Status: She is alert and oriented to person, place, and time.   Psychiatric:         Mood and Affect: Mood normal.         Behavior: Behavior normal.         ED Course   Labs:   Labs Reviewed   CBC WITH DIFFERENTIAL WITH PLATELET - Abnormal; Notable for the following components:       Result Value    HGB 11.8 (*)     RDW-SD 47.8 (*)     All other components within normal limits   COMP METABOLIC PANEL (14) - Normal   HCG, BETA SUBUNIT, QUAL - Normal   LIPASE - Normal   RAINBOW DRAW LAVENDER   RAINBOW DRAW LIGHT GREEN     Radiology findings:  I personally reviewed the images.   No results found.      Cardiac Monitor: Interpreted by me.   Pulse Readings from Last 1 Encounters:   08/06/24 77   , sinus,     External non-ED records reviewed independently by me: Labs from 6/10/2024 with baseline creatinine of 0.73.    MDM   43-year-old female with a past medical history of  seizure disorder presenting for evaluation with nausea, vomiting, diarrhea over the past 3 days after eating at a restaurant.  On exam, she has reproducible tenderness in the epigastrium with no rebound or guarding.  Overall well-appearing.  Appears well-hydrated and well-perfused.    Differential diagnoses considered includes, but is not limited to: Viral gastroenteritis, foodborne illness, gastritis, pancreatitis, biliary obstructive process, hepatitis    Will obtain the following tests: CBC, CMP, lipase, beta hCG qualitative  Please see ED course for my independent review of these tests/imaging results.    Initial Medications/Therapeutics administered: Zofran, IV fluid bolus    Chronic conditions affecting care: Seizure disorder    Workup and medications considered but not ordered: Consider abdominal imaging however exam is fairly benign with no laboratory abnormalities to suggest biliary obstructive process or pancreatitis.    Social Determinants of Health that impacted care: none     ED course: Labs reassuring.  No evidence of biliary obstructive process.  Lipase not elevated.  Upon reevaluation, noted to have resolution of her nausea and vomiting and abdominal pain.  Suspect symptoms secondary to viral gastroenteritis versus foodborne illness.  Discussed symptomatic care with Zofran and Bentyl as needed.  Return precautions provided and all questions answered.  Patient expressed understanding and agreement with plan        Disposition and Plan     Clinical Impression:  1. Nausea vomiting and diarrhea        Disposition:  Discharge    Follow-up:  Desmond Cote MD  [address] [place] [id number] [phone]    Schedule an appointment as soon as possible for a visit in 2 day(s)  As needed, If symptoms worsen      Medications Prescribed:  Discharge Medication List as of 8/6/2024  2:00 PM        START taking these medications    Details   ondansetron 4 MG Oral Tablet Dispersible Take 1 tablet (4 mg  total) by mouth every 4 (four) hours as needed for Nausea., Normal, Disp-10 tablet, R-0      dicyclomine 20 MG Oral Tab Take 1 tablet (20 mg total) by mouth 4 (four) times daily as needed., Normal, Disp-30 tablet, R-0               This note may have been created using voice dictation technology and may include inadvertent errors.      Barbara Ludwig MD  Emergency Medicine

## 2024-08-06 NOTE — DISCHARGE INSTRUCTIONS
Thank you for seeking care at [facility] Emergency Department.  You have been seen and evaluated for vomiting.    We discussed the results of your workup   Please read the instructions provided   If given prescriptions, take as instructed    Please drink plenty of fluids at home. Progressively advance your diet as tolerated. Drink clear, sugar-free, non-citrus liquids frequently in small amounts. Advance your diet from bland foods (crackers, plain boiled rice, potato, noodles, pasta, oatmeal, boiled egg, boiled vegetables, broths, applesauce, bananas, non-citrus fruits) as tolerated. Avoid greasy, fatty, or fried foods, fast food, junk food or snack food.    Remember, your care process does not end after your visit today. Please follow-up with your doctor within 1-2 days for a follow-up check to ensure you are  improving, to see if you need any further evaluation/testing, or to evaluate for any alternate diagnoses.     Please return to the emergency department if you develop high fevers, have persistent severe abdominal pain, or have bloody stools or vomit, if you are unable to keep down liquids due to frequent vomiting, or if you develop any other new or concerning symptoms as these could be signs of more serious medical illness.    We hope you feel better.

## 2024-08-06 NOTE — ED INITIAL ASSESSMENT (HPI)
Pt came in for vomiting, diarrhea,generalized abdominal pain started Saturday after eating out.  Per pt she can't keep anything down.  Pt is A/Ox  4, breathing unlabored.

## 2024-12-07 ENCOUNTER — APPOINTMENT (OUTPATIENT)
Dept: GENERAL RADIOLOGY | Facility: HOSPITAL | Age: 44
End: 2024-12-07
Attending: EMERGENCY MEDICINE
Payer: MEDICAID

## 2024-12-07 ENCOUNTER — HOSPITAL ENCOUNTER (EMERGENCY)
Facility: HOSPITAL | Age: 44
Discharge: HOME | End: 2024-12-07
Attending: EMERGENCY MEDICINE
Payer: MEDICAID

## 2024-12-07 VITALS
SYSTOLIC BLOOD PRESSURE: 118 MMHG | RESPIRATION RATE: 18 BRPM | HEART RATE: 68 BPM | TEMPERATURE: 98 F | DIASTOLIC BLOOD PRESSURE: 73 MMHG | OXYGEN SATURATION: 100 %

## 2024-12-07 DIAGNOSIS — R07.89 CHEST PAIN, ATYPICAL: Primary | ICD-10-CM

## 2024-12-07 LAB
ALBUMIN SERPL-MCNC: 4.4 G/DL (ref 3.2–4.8)
ALP LIVER SERPL-CCNC: 87 U/L
ALT SERPL-CCNC: 8 U/L
ANION GAP SERPL CALC-SCNC: 6 MMOL/L (ref 0–18)
AST SERPL-CCNC: 13 U/L (ref ?–34)
BASOPHILS # BLD AUTO: 0.02 X10(3) UL (ref 0–0.2)
BASOPHILS NFR BLD AUTO: 0.3 %
BILIRUB DIRECT SERPL-MCNC: <0.1 MG/DL (ref ?–0.3)
BILIRUB SERPL-MCNC: 0.4 MG/DL (ref 0.3–1.2)
BUN BLD-MCNC: 13 MG/DL (ref 9–23)
BUN/CREAT SERPL: 19.7 (ref 10–20)
CALCIUM BLD-MCNC: 9.6 MG/DL (ref 8.7–10.4)
CHLORIDE SERPL-SCNC: 107 MMOL/L (ref 98–112)
CO2 SERPL-SCNC: 25 MMOL/L (ref 21–32)
CREAT BLD-MCNC: 0.66 MG/DL
D DIMER PPP FEU-MCNC: 0.29 UG/ML FEU (ref ?–0.5)
DEPRECATED RDW RBC AUTO: 46.6 FL (ref 35.1–46.3)
EGFRCR SERPLBLD CKD-EPI 2021: 112 ML/MIN/1.73M2 (ref 60–?)
EOSINOPHIL # BLD AUTO: 0.05 X10(3) UL (ref 0–0.7)
EOSINOPHIL NFR BLD AUTO: 0.7 %
ERYTHROCYTE [DISTWIDTH] IN BLOOD BY AUTOMATED COUNT: 14.6 % (ref 11–15)
GLUCOSE BLD-MCNC: 96 MG/DL (ref 70–99)
HCT VFR BLD AUTO: 35.7 %
HGB BLD-MCNC: 12 G/DL
IMM GRANULOCYTES # BLD AUTO: 0.02 X10(3) UL (ref 0–1)
IMM GRANULOCYTES NFR BLD: 0.3 %
LIPASE SERPL-CCNC: 24 U/L (ref 12–53)
LYMPHOCYTES # BLD AUTO: 2.61 X10(3) UL (ref 1–4)
LYMPHOCYTES NFR BLD AUTO: 36.7 %
MCH RBC QN AUTO: 29.4 PG (ref 26–34)
MCHC RBC AUTO-ENTMCNC: 33.6 G/DL (ref 31–37)
MCV RBC AUTO: 87.5 FL
MONOCYTES # BLD AUTO: 0.42 X10(3) UL (ref 0.1–1)
MONOCYTES NFR BLD AUTO: 5.9 %
NEUTROPHILS # BLD AUTO: 3.99 X10 (3) UL (ref 1.5–7.7)
NEUTROPHILS # BLD AUTO: 3.99 X10(3) UL (ref 1.5–7.7)
NEUTROPHILS NFR BLD AUTO: 56.1 %
OSMOLALITY SERPL CALC.SUM OF ELEC: 286 MOSM/KG (ref 275–295)
PLATELET # BLD AUTO: 318 10(3)UL (ref 150–450)
POTASSIUM SERPL-SCNC: 4.3 MMOL/L (ref 3.5–5.1)
PROT SERPL-MCNC: 7 G/DL (ref 5.7–8.2)
RBC # BLD AUTO: 4.08 X10(6)UL
SODIUM SERPL-SCNC: 138 MMOL/L (ref 136–145)
TROPONIN I SERPL HS-MCNC: <3 NG/L
WBC # BLD AUTO: 7.1 X10(3) UL (ref 4–11)

## 2024-12-07 PROCEDURE — 80048 BASIC METABOLIC PNL TOTAL CA: CPT | Performed by: EMERGENCY MEDICINE

## 2024-12-07 PROCEDURE — 80076 HEPATIC FUNCTION PANEL: CPT | Performed by: EMERGENCY MEDICINE

## 2024-12-07 PROCEDURE — 84484 ASSAY OF TROPONIN QUANT: CPT | Performed by: EMERGENCY MEDICINE

## 2024-12-07 PROCEDURE — 99284 EMERGENCY DEPT VISIT MOD MDM: CPT

## 2024-12-07 PROCEDURE — 93010 ELECTROCARDIOGRAM REPORT: CPT

## 2024-12-07 PROCEDURE — 36415 COLL VENOUS BLD VENIPUNCTURE: CPT

## 2024-12-07 PROCEDURE — 85379 FIBRIN DEGRADATION QUANT: CPT | Performed by: EMERGENCY MEDICINE

## 2024-12-07 PROCEDURE — 93005 ELECTROCARDIOGRAM TRACING: CPT

## 2024-12-07 PROCEDURE — 99285 EMERGENCY DEPT VISIT HI MDM: CPT

## 2024-12-07 PROCEDURE — 71045 X-RAY EXAM CHEST 1 VIEW: CPT | Performed by: EMERGENCY MEDICINE

## 2024-12-07 PROCEDURE — 83690 ASSAY OF LIPASE: CPT | Performed by: EMERGENCY MEDICINE

## 2024-12-07 PROCEDURE — 85025 COMPLETE CBC W/AUTO DIFF WBC: CPT | Performed by: EMERGENCY MEDICINE

## 2024-12-07 RX ORDER — IBUPROFEN 600 MG/1
600 TABLET, FILM COATED ORAL ONCE
Status: COMPLETED | OUTPATIENT
Start: 2024-12-07 | End: 2024-12-07

## 2024-12-07 NOTE — ED INITIAL ASSESSMENT (HPI)
Central chest pain radiating to back. x2 weeks. Patient states pain increasing, unable to sleep on back. Denies SOB. Denies blood thinners.

## 2024-12-07 NOTE — ED PROVIDER NOTES
Patient Seen in: [facility] Emergency Department    History     Chief Complaint   Patient presents with    Chest Pain Angina     Stated Complaint: chest/back pain    HPI    44 year old female presenting with chest pain. Pain began about 2 weeks ago . The patient describes the pain as sharp, located at the xiphoid process, with radiation to the mid back.   Associated symptoms are none.  Aggravating factors are sleeping on back.  Alleviating factors are:  none.   Patient's cardiac risk factors are BMI and smoking.    Patient's risk factors for DVT/PE: none.      Past Medical History:    Seizure disorder (HCC)    Shingles    Thyroid disease       History reviewed. No pertinent surgical history.         No family history on file.    Social History     Socioeconomic History    Marital status:    Tobacco Use    Smoking status: Some Days     Types: Cigarettes    Smokeless tobacco: Never   Vaping Use    Vaping status: Some Days   Substance and Sexual Activity    Alcohol use: Yes     Comment: social    Drug use: Yes     Types: Cannabis     Social Drivers of Health     Housing Stability: Low Risk  (10/11/2023)    [facility]    Housing Stability   Recent Concern: Housing Stability - At Risk (8/16/2023)    [facility]       Review of Systems    Positive for stated complaint: chest/back pain  Other systems are as noted in HPI.  Constitutional and vital signs reviewed.      All other systems reviewed and negative except as noted above.    PSFH elements reviewed from today and agreed except as otherwise stated in HPI.    Physical Exam     ED Triage Vitals [12/07/24 0617]   /73   Pulse 84   Resp 18   Temp 98.4 °F (36.9 °C)   Temp src Temporal   SpO2 98 %   O2 Device None (Room air)       Current:/73   Pulse 68   Temp 98.4 °F (36.9 °C) (Temporal)   Resp 18   LMP 04/18/2024 (Approximate)   SpO2 100%   PULSE  OX 98% room air        Physical Exam    Constitutional: Well-appearing and ambulatory, no acute distress  HENT: mmm, no lesions,  Neck: normal range of motion, no tenderness, supple.  Eyes: PERRL, EOMI, conjunctiva normal, no discharge. Sclera anicteric.  Cardiovascular: Regular rate rhythm, normal S1-S2, no extra heart sounds or murmurs, no peripheral edema, bilateral radial pulses 2+.  There is reproducible tenderness overlying the xiphoid process without underlying rash or lesion  Respiratory: Normal breath sounds, no respiratory distress, no wheezing, no chest tenderness.  GI: Bowel sounds normal, Soft, no tenderness, no masses, no pulsatile masses.  : No CVA tenderness.  Skin: Warm, dry, no erythema, no rash.  Musculoskeletal: Intact distal pulses, no edema, no tenderness, no cyanosis, no clubbing. Good range of motion in all major joints. No tenderness to palpation or major deformities noted. Back- No tenderness.  Neurologic: Alert & oriented x 3, normal motor function, normal sensory function, no focal deficits noted.  Psych: Calm, cooperative, nl affect    Differential diagnosis to include Acute coronary syndrome vs. Acute pulmonary process including pneumothorax vs. Dissection vs.  pleurisy vs. PE vs. Pneumonia/effusion vs. GI related pathology such as GERD or gastritis, vs. Musculoskeletal        ED Course     Labs Reviewed   CBC WITH DIFFERENTIAL WITH PLATELET - Abnormal; Notable for the following components:       Result Value    RDW-SD 46.6 (*)     All other components within normal limits   HEPATIC FUNCTION PANEL (7) - Abnormal; Notable for the following components:    ALT 8 (*)     All other components within normal limits   BASIC METABOLIC PANEL (8) - Normal   TROPONIN I HIGH SENSITIVITY - Normal   LIPASE - Normal   D-DIMER - Normal     EKG    Rate, intervals and axes as noted on EKG Report.  Rate: 76  Rhythm: Sinus Rhythm  Reading: No STEMI, no ectopy           MDM     Monitor  Interpretation:  Normal sinus rhythm    Radiology Interpretation:  XR CHEST AP PORTABLE  (CPT=71045)    Result Date: 12/7/2024  CONCLUSION:  Stable chest without radiographically evident acute intrathoracic process.    Dictated by (CST): Luis A Mendez MD on 12/07/2024 at 7:47 AM     Finalized by (CST): Luis A Mendez MD on 12/07/2024 at 7:48 AM             Aortic Dissection Risk Assesment:    High risk Conditions (Marfan, Fhx,Known aortic valve disease, known dissection or recent aortic manipulation) none  High Risk Pain Features (Severe. Abrupt Ripping or tearing) none  High Risk Exam Features (pulse deficit, BP difference, focal neuro deficit,murmur of aortic insufficiency, hypotension or shock) none     Medical Decision Making  Vitals stable in the ED.  Patient presents with subacute chest pain which is reproducible at the xiphoid.  I ordered interpreted labs including CBC, BMP, troponin, D-dimer, LFTs and lipase which are unremarkable.  I have low suspicion for acute coronary syndrome and heart score is low risk as below.  Per my independent interpretation of chest x-ray, no clear pneumonia.  Additionally with normal D-dimer and low pretest probability for PE or dissection, would not pursue advanced imaging.  Discussed supportive care, ibuprofen or Tylenol as needed and PCP follow-up.    Problems Addressed:  Chest pain, atypical: complicated acute illness or injury with systemic symptoms that poses a threat to life or bodily functions    Amount and/or Complexity of Data Reviewed  Labs: ordered. Decision-making details documented in ED Course.  Radiology: ordered and independent interpretation performed. Decision-making details documented in ED Course.  ECG/medicine tests: ordered and independent interpretation performed. Decision-making details documented in ED Course.    Risk  Decision regarding hospitalization.          Heart Score:    HEART Score      Title      Criteria Score   Age: <45 Age Score: 0    History: Slightly Suspicious Hx Score: 0     EKG: Normal EKG Score: 0   HTN: No   Hypercholesterolemia: No   Atherosclerosis/PVD: No     DM: No   BMI>30kg/m2: Yes   Smoking: Yes   Family History: No         Other Risk Factor Score: 2             Lab Results   Component Value Date    TROPHS <3 12/07/2024           HEART Score: 1        Risk of adverse cardiac event is 0.9-1.7%              Disposition and Plan     Clinical Impression:  1. Chest pain, atypical        Disposition:  Discharge    Follow-up:  Danielle Live MD  [address] [address]  [phone]    Call  PCP for follow up      Medications Prescribed:  Discharge Medication List as of 12/7/2024  8:37 AM

## 2024-12-08 LAB
ATRIAL RATE: 76 BPM
P AXIS: 40 DEGREES
P-R INTERVAL: 130 MS
Q-T INTERVAL: 362 MS
QRS DURATION: 72 MS
QTC CALCULATION (BEZET): 407 MS
R AXIS: 84 DEGREES
T AXIS: 35 DEGREES
VENTRICULAR RATE: 76 BPM

## (undated) NOTE — LETTER
Date & Time: 8/25/2023, 10:30 AM  Patient: Argentina Pretty  Encounter Provider(s): Velma Gosselin, MD       To Whom It May Concern:    Argentina Pretty was seen and treated in our department on 8/25/2023. She can return to work on Monday 8/28/2023.     If you have any questions or concerns, please do not hesitate to call.        _____________________________  Physician/APC Signature

## (undated) NOTE — LETTER
Date & Time: 12/7/2024, 8:37 AM  Patient: Krystal Morales  Encounter Provider(s):    Hafsa Woodard MD       To Whom It May Concern:    Krystal Morales was seen and treated in our department on 12/7/2024. She should not return to work until 12/8/2024 .    If you have any questions or concerns, please do not hesitate to call.        _____________________________  Physician/APC Signature